# Patient Record
Sex: FEMALE | Race: WHITE | Employment: FULL TIME | URBAN - NONMETROPOLITAN AREA
[De-identification: names, ages, dates, MRNs, and addresses within clinical notes are randomized per-mention and may not be internally consistent; named-entity substitution may affect disease eponyms.]

---

## 2023-02-16 ENCOUNTER — HOSPITAL ENCOUNTER (OUTPATIENT)
Age: 63
Setting detail: SPECIMEN
Discharge: HOME | End: 2023-02-16

## 2023-02-16 ENCOUNTER — OFFICE VISIT (OUTPATIENT)
Dept: FAMILY MEDICINE CLINIC | Age: 63
End: 2023-02-16

## 2023-02-16 VITALS
DIASTOLIC BLOOD PRESSURE: 76 MMHG | BODY MASS INDEX: 29.52 KG/M2 | HEART RATE: 78 BPM | SYSTOLIC BLOOD PRESSURE: 114 MMHG | WEIGHT: 160.4 LBS | HEIGHT: 62 IN | OXYGEN SATURATION: 97 %

## 2023-02-16 DIAGNOSIS — N39.0 URINARY TRACT INFECTION WITH HEMATURIA, SITE UNSPECIFIED: ICD-10-CM

## 2023-02-16 DIAGNOSIS — R30.9 PAINFUL URINATION: ICD-10-CM

## 2023-02-16 DIAGNOSIS — R30.9 PAINFUL URINATION: Primary | ICD-10-CM

## 2023-02-16 DIAGNOSIS — R31.9 URINARY TRACT INFECTION WITH HEMATURIA, SITE UNSPECIFIED: ICD-10-CM

## 2023-02-16 LAB
BILIRUBIN, POC: NORMAL
BLOOD URINE, POC: NORMAL
CLARITY, POC: CLEAR
COLOR, POC: YELLOW
GLUCOSE URINE, POC: NORMAL
KETONES, POC: NORMAL
LEUKOCYTE EST, POC: NORMAL
NITRITE, POC: NORMAL
PH, POC: 6
PROTEIN, POC: NORMAL
SPECIFIC GRAVITY, POC: 1.01
UROBILINOGEN, POC: 0.2

## 2023-02-16 PROCEDURE — 87086 URINE CULTURE/COLONY COUNT: CPT

## 2023-02-16 PROCEDURE — 99203 OFFICE O/P NEW LOW 30 MIN: CPT

## 2023-02-16 PROCEDURE — 81002 URINALYSIS NONAUTO W/O SCOPE: CPT

## 2023-02-16 PROCEDURE — 99212 OFFICE O/P EST SF 10 MIN: CPT

## 2023-02-16 RX ORDER — CEPHALEXIN 500 MG/1
500 CAPSULE ORAL 3 TIMES DAILY
Qty: 21 CAPSULE | Refills: 0 | Status: SHIPPED | OUTPATIENT
Start: 2023-02-16 | End: 2023-02-23

## 2023-02-16 RX ORDER — PHENAZOPYRIDINE HYDROCHLORIDE 100 MG/1
100 TABLET, FILM COATED ORAL 3 TIMES DAILY PRN
Qty: 6 TABLET | Refills: 0 | Status: SHIPPED | OUTPATIENT
Start: 2023-02-16 | End: 2024-02-16

## 2023-02-16 SDOH — ECONOMIC STABILITY: FOOD INSECURITY: WITHIN THE PAST 12 MONTHS, THE FOOD YOU BOUGHT JUST DIDN'T LAST AND YOU DIDN'T HAVE MONEY TO GET MORE.: NEVER TRUE

## 2023-02-16 SDOH — ECONOMIC STABILITY: INCOME INSECURITY: HOW HARD IS IT FOR YOU TO PAY FOR THE VERY BASICS LIKE FOOD, HOUSING, MEDICAL CARE, AND HEATING?: NOT HARD AT ALL

## 2023-02-16 SDOH — ECONOMIC STABILITY: HOUSING INSECURITY
IN THE LAST 12 MONTHS, WAS THERE A TIME WHEN YOU DID NOT HAVE A STEADY PLACE TO SLEEP OR SLEPT IN A SHELTER (INCLUDING NOW)?: NO

## 2023-02-16 SDOH — ECONOMIC STABILITY: FOOD INSECURITY: WITHIN THE PAST 12 MONTHS, YOU WORRIED THAT YOUR FOOD WOULD RUN OUT BEFORE YOU GOT MONEY TO BUY MORE.: NEVER TRUE

## 2023-02-16 ASSESSMENT — PATIENT HEALTH QUESTIONNAIRE - PHQ9
SUM OF ALL RESPONSES TO PHQ9 QUESTIONS 1 & 2: 0
SUM OF ALL RESPONSES TO PHQ QUESTIONS 1-9: 0
SUM OF ALL RESPONSES TO PHQ QUESTIONS 1-9: 0
1. LITTLE INTEREST OR PLEASURE IN DOING THINGS: 0
SUM OF ALL RESPONSES TO PHQ QUESTIONS 1-9: 0
SUM OF ALL RESPONSES TO PHQ QUESTIONS 1-9: 0
2. FEELING DOWN, DEPRESSED OR HOPELESS: 0

## 2023-02-18 LAB
MICROORGANISM SPEC CULT: NORMAL
SPECIMEN DESCRIPTION: NORMAL

## 2023-02-20 ENCOUNTER — TELEPHONE (OUTPATIENT)
Dept: FAMILY MEDICINE CLINIC | Age: 63
End: 2023-02-20

## 2023-02-20 DIAGNOSIS — R30.9 PAINFUL URINATION: Primary | ICD-10-CM

## 2023-02-20 RX ORDER — PENTOSAN POLYSULFATE SODIUM 100 MG/1
100 CAPSULE, GELATIN COATED ORAL
Qty: 42 CAPSULE | Refills: 0 | Status: SHIPPED | OUTPATIENT
Start: 2023-02-20 | End: 2023-03-06

## 2023-02-20 ASSESSMENT — ENCOUNTER SYMPTOMS
CHEST TIGHTNESS: 0
SHORTNESS OF BREATH: 0
ABDOMINAL PAIN: 0

## 2023-02-20 NOTE — PROGRESS NOTES
Max Morgan (:  1960) is a 63 y.o. female,Established patient, here for evaluation of the following chief complaint(s):  Urinary Tract Infection (Pt is here to get established. She states she has had urination problem. She is currently having having burning with urination, pain when urination, frequent urination. She has not been to a doctor for a couple of years. )         ASSESSMENT/PLAN:  1. Painful urination  -     POCT Urinalysis no Micro  -     cephALEXin (KEFLEX) 500 MG capsule; Take 1 capsule by mouth 3 times daily for 7 days, Disp-21 capsule, R-0Normal  -     phenazopyridine (PYRIDIUM) 100 MG tablet; Take 1 tablet by mouth 3 times daily as needed for Pain, Disp-6 tablet, R-0Normal  -     Culture, Urine; Future  2. Urinary tract infection with hematuria, site unspecified  -     cephALEXin (KEFLEX) 500 MG capsule; Take 1 capsule by mouth 3 times daily for 7 days, Disp-21 capsule, R-0Normal  -     phenazopyridine (PYRIDIUM) 100 MG tablet; Take 1 tablet by mouth 3 times daily as needed for Pain, Disp-6 tablet, R-0Normal  -     Culture, Urine; Future   -Positive POCT urine we will start on Keflex 500 mg 3 times a day for 7 days. We will send urine for culture, also start on Pyridium 100 mg 3 times a day. Warning about Pyridium causing staining as well as staining contact lenses. Kidney verbalized understanding of this. If worsening symptoms such as increased fever, flank pain she is to go to the emergency department    Return in about 1 month (around 3/16/2023), or if symptoms worsen or fail to improve. Subjective   SUBJECTIVE/OBJECTIVE:  Rosie Lujan is here today for the complaint of burning with urination. Denies fever, denies chills. Denies hematuria, states this has been ongoing for several days now. She has not taken anything over-the-counter to help with this. Urinary Tract Infection      Review of Systems   Constitutional:  Negative for fatigue and fever.    Respiratory:  Negative for chest tightness and shortness of breath. Cardiovascular:  Negative for chest pain and leg swelling. Gastrointestinal:  Negative for abdominal pain. Genitourinary:  Positive for dysuria, frequency and urgency. Objective   Physical Exam  Vitals and nursing note reviewed. Constitutional:       Appearance: Normal appearance. HENT:      Head: Normocephalic. Right Ear: External ear normal.      Left Ear: External ear normal.   Cardiovascular:      Rate and Rhythm: Normal rate and regular rhythm. Pulses: Normal pulses. Heart sounds: Normal heart sounds. Pulmonary:      Effort: Pulmonary effort is normal.      Breath sounds: No wheezing or rhonchi. Abdominal:      General: Bowel sounds are normal.      Tenderness: There is no abdominal tenderness. There is no right CVA tenderness or left CVA tenderness. Musculoskeletal:      Cervical back: Normal range of motion. Skin:     Capillary Refill: Capillary refill takes less than 2 seconds. Neurological:      General: No focal deficit present. Mental Status: She is alert. An electronic signature was used to authenticate this note.     --CHERRY Galindo - CNP

## 2023-02-20 NOTE — TELEPHONE ENCOUNTER
Patient called she is still having pressure and pain from UTI, she was wanting to know what she can take with her antibiotic as it is still unbearable. Please advise.  Verified phone number to call back as [phone]

## 2023-03-16 ENCOUNTER — OFFICE VISIT (OUTPATIENT)
Dept: FAMILY MEDICINE CLINIC | Age: 63
End: 2023-03-16

## 2023-03-16 VITALS
HEART RATE: 80 BPM | WEIGHT: 157 LBS | DIASTOLIC BLOOD PRESSURE: 66 MMHG | BODY MASS INDEX: 28.72 KG/M2 | SYSTOLIC BLOOD PRESSURE: 118 MMHG | OXYGEN SATURATION: 96 %

## 2023-03-16 DIAGNOSIS — R10.9 ABDOMINAL PRESSURE: ICD-10-CM

## 2023-03-16 DIAGNOSIS — N93.9 ABNORMAL UTERINE BLEEDING (AUB): Primary | ICD-10-CM

## 2023-03-16 DIAGNOSIS — F17.219 CIGARETTE NICOTINE DEPENDENCE WITH NICOTINE-INDUCED DISORDER: ICD-10-CM

## 2023-03-16 DIAGNOSIS — Z13.220 LIPID SCREENING: ICD-10-CM

## 2023-03-16 DIAGNOSIS — N32.81 OVERACTIVE BLADDER: ICD-10-CM

## 2023-03-16 DIAGNOSIS — Z72.0 TOBACCO ABUSE: ICD-10-CM

## 2023-03-16 RX ORDER — OXYBUTYNIN CHLORIDE 10 MG/1
10 TABLET, EXTENDED RELEASE ORAL DAILY
Qty: 30 TABLET | Refills: 3 | Status: SHIPPED | OUTPATIENT
Start: 2023-03-16

## 2023-03-16 ASSESSMENT — ENCOUNTER SYMPTOMS
SINUS PRESSURE: 0
COLOR CHANGE: 0
BACK PAIN: 0
CONSTIPATION: 0
RHINORRHEA: 0
EYE DISCHARGE: 0
DIARRHEA: 0
ABDOMINAL PAIN: 1
WHEEZING: 0
SHORTNESS OF BREATH: 0
CHEST TIGHTNESS: 0
BLOOD IN STOOL: 0
EYE ITCHING: 0
SINUS PAIN: 0
NAUSEA: 0
COUGH: 0
ANAL BLEEDING: 0

## 2023-03-16 NOTE — PROGRESS NOTES
Rebecca Martin (:  1960) is a 62 y.o. female,Established patient, here for evaluation of the following chief complaint(s):  Vaginal Bleeding (Patient is here to follow up for her urinary issues and does see urology on 4/3. She has also had ongoing vaginal bleeding that comes and goes.  She had an episode last night that for over an hour she had heavy bleeding with severe pelvic pain. )         ASSESSMENT/PLAN:  1. Abnormal uterine bleeding (AUB)  -     Amb External Referral To Ob-Gyn  -     CBC with Auto Differential; Future  -     Comprehensive Metabolic Panel, Fasting; Future  -     US NON OB TRANSVAGINAL; Future  -     TSH With Reflex Ft4; Future  -     Prolactin; Future  -     Follicle Stimulating Hormone; Future  -     HCG Qualitative, Serum; Future  2. Overactive bladder  -     oxybutynin (DITROPAN XL) 10 MG extended release tablet; Take 1 tablet by mouth daily, Disp-30 tablet, R-3Normal  3. Lipid screening  -     Lipid, Fasting; Future  4. Tobacco abuse  -     CT chest diagnostic low dose; Future  -Abnormal uterine bleeding postmenopausal is noted.  When asked last regular menstruation prior to menopause she is unsure she stated \"several years\".  She does have episodic abnormal uterine bleeding last time last night.  She states her flow is heavy, did soak several pads more than 2 in 2 hours.  Stated this lasted for approximately 2 to 3 hours and then completely stopped.  During that time she had cramping which she related to feeling quite like when she had menstruations in the past.  This has happened prior several months ago.  She also has accompanying associated symptom of abdominal pressure, this is also been ongoing for several months.  She has not seen GYN or had a pelvic exam in numerous years.  This is strongly encouraged, however she states she is going to see GYN for this.  Risk of this is discussed and she agrees that she would still rather see GYN.  We will order ultrasound, as well as  blood work. Office will call with results. Will call to make appointment with GYN as soon as possible. Return in about 6 months (around 9/16/2023), or if symptoms worsen or fail to improve. Subjective   SUBJECTIVE/OBJECTIVE:  Vaginal Bleeding  The patient's primary symptoms include pelvic pain and vaginal bleeding. The patient's pertinent negatives include no genital itching, genital lesions, genital odor or vaginal discharge. This is a recurrent problem. The current episode started more than 1 month ago. The problem occurs intermittently. The problem has been unchanged. The pain is moderate. The problem affects both sides. Associated symptoms include abdominal pain. Pertinent negatives include no back pain, chills, constipation, diarrhea, dysuria, fever, flank pain, frequency, headaches or nausea. The vaginal discharge was bloody. There has been no bleeding. She has not been passing clots. She has not been passing tissue. Nothing aggravates the symptoms. She has tried nothing for the symptoms. No, her partner does not have an STD. She is postmenopausal. Her past medical history is significant for ovarian cysts. Review of Systems   Constitutional:  Negative for chills, fatigue, fever and unexpected weight change. HENT:  Negative for congestion, ear pain, rhinorrhea, sinus pressure and sinus pain. Eyes:  Negative for discharge, itching and visual disturbance. Respiratory:  Negative for cough, chest tightness, shortness of breath and wheezing. Cardiovascular:  Negative for chest pain, palpitations and leg swelling. Gastrointestinal:  Positive for abdominal pain. Negative for anal bleeding, blood in stool, constipation, diarrhea and nausea. Endocrine: Negative for cold intolerance, heat intolerance, polydipsia and polyphagia. Genitourinary:  Positive for pelvic pain and vaginal bleeding. Negative for dysuria, flank pain, frequency, vaginal discharge and vaginal pain.    Musculoskeletal: Negative for arthralgias, back pain and myalgias. Skin:  Negative for color change. Allergic/Immunologic: Negative for environmental allergies and food allergies. Neurological:  Negative for dizziness, weakness, light-headedness, numbness and headaches. Hematological:  Does not bruise/bleed easily. Psychiatric/Behavioral:  Negative for agitation, behavioral problems and confusion. The patient is not nervous/anxious. Objective   Physical Exam  Vitals and nursing note reviewed. Constitutional:       Appearance: Normal appearance. HENT:      Head: Normocephalic. Right Ear: External ear normal.      Left Ear: External ear normal.   Cardiovascular:      Rate and Rhythm: Normal rate and regular rhythm. Pulses: Normal pulses. Heart sounds: Normal heart sounds. Pulmonary:      Effort: Pulmonary effort is normal.      Breath sounds: No wheezing or rhonchi. Abdominal:      General: Bowel sounds are normal.      Tenderness: There is no abdominal tenderness. There is no right CVA tenderness or left CVA tenderness. Genitourinary:     Comments: Refused, stated she is going to see GYN, referral then placed with U/S  Musculoskeletal:         General: Normal range of motion. Cervical back: Normal range of motion. Skin:     Capillary Refill: Capillary refill takes less than 2 seconds. Neurological:      General: No focal deficit present. Mental Status: She is alert. An electronic signature was used to authenticate this note.     --CHERRY Rudd - CNP

## 2023-03-17 LAB
ALBUMIN/GLOBULIN RATIO: 1.4 G/DL
ALBUMIN: 4.1 G/DL (ref 3.5–5)
ALP BLD-CCNC: 63 UNITS/L (ref 38–126)
ALT SERPL-CCNC: 13 UNITS/L (ref 4–35)
ANION GAP SERPL CALCULATED.3IONS-SCNC: 2 MMOL/L
AST SERPL-CCNC: 20 UNITS/L (ref 14–36)
BASOPHILS %: 1.84 (ref 0–3)
BASOPHILS ABSOLUTE: 0.18 (ref 0–0.3)
BILIRUB SERPL-MCNC: 0.6 MG/DL (ref 0.2–1.3)
BUN BLDV-MCNC: 10 MG/DL (ref 7–17)
CALCIUM SERPL-MCNC: 9.2 MG/DL (ref 8.4–10.2)
CHLORIDE BLD-SCNC: 106 MMOL/L (ref 98–120)
CHOLESTEROL/HDL RATIO: 6.13 RATIO (ref 0–4.5)
CHOLESTEROL: 239 MG/DL (ref 50–200)
CO2: 30 MMOL/L (ref 22–31)
CREAT SERPL-MCNC: 0.5 MG/DL (ref 0.5–1)
EOSINOPHILS %: 1.31 (ref 0–10)
EOSINOPHILS ABSOLUTE: 0.13 (ref 0–1.1)
GFR CALCULATED: > 60
GLOBULIN: 3 G/DL
GLUCOSE: 102 MG/DL (ref 65–105)
HCT VFR BLD CALC: 43.6 % (ref 37–47)
HDLC SERPL-MCNC: 39 MG/DL (ref 36–68)
HEMOGLOBIN: 15.1 (ref 12–16)
LDL CHOLESTEROL CALCULATED: 157.4 MG/DL (ref 0–160)
LYMPHOCYTE %: 30.09 (ref 20–51.1)
LYMPHOCYTES ABSOLUTE: 2.92 (ref 1–5.5)
MCH RBC QN AUTO: 30.7 PG (ref 28.5–32.5)
MCHC RBC AUTO-ENTMCNC: 34.6 G/DL (ref 32–37)
MCV RBC AUTO: 88.8 FL (ref 80–94)
MONOCYTES %: 4.97 (ref 1.7–9.3)
MONOCYTES ABSOLUTE: 0.48 (ref 0.1–1)
NEUTROPHILS %: 61.78 (ref 42.2–75.2)
NEUTROPHILS ABSOLUTE: 5.99 (ref 2–8.1)
PDW BLD-RTO: 12.2 % (ref 8.5–15.5)
PLATELET # BLD: 325.5 THOU/MM3 (ref 130–400)
POTASSIUM SERPL-SCNC: 4.6 MMOL/L (ref 3.6–5)
RBC: 4.91 M/UL (ref 4.2–5.4)
SODIUM BLD-SCNC: 139 MMOL/L (ref 135–145)
TOTAL PROTEIN, SERUM: 7.1 G/DL (ref 6.3–8.2)
TRIGL SERPL-MCNC: 213 MG/DL (ref 10–250)
VLDLC SERPL CALC-MCNC: 43 MG/DL (ref 0–50)
WBC: 9.7 THOU/ML3 (ref 4.8–10.8)

## 2023-03-29 ENCOUNTER — TRANSCRIBE ORDERS (OUTPATIENT)
Dept: ADMINISTRATIVE | Age: 63
End: 2023-03-29

## 2023-03-29 DIAGNOSIS — M54.12 RADICULOPATHY, CERVICAL REGION: Primary | ICD-10-CM

## 2023-04-03 ENCOUNTER — HOSPITAL ENCOUNTER (OUTPATIENT)
Age: 63
Discharge: HOME | End: 2023-04-03
Payer: COMMERCIAL

## 2023-04-03 ENCOUNTER — OFFICE VISIT (OUTPATIENT)
Dept: UROLOGY | Age: 63
End: 2023-04-03
Payer: COMMERCIAL

## 2023-04-03 VITALS
WEIGHT: 154 LBS | RESPIRATION RATE: 12 BRPM | OXYGEN SATURATION: 97 % | HEART RATE: 78 BPM | HEIGHT: 62 IN | DIASTOLIC BLOOD PRESSURE: 86 MMHG | SYSTOLIC BLOOD PRESSURE: 136 MMHG | BODY MASS INDEX: 28.34 KG/M2

## 2023-04-03 DIAGNOSIS — R10.9 ABDOMINAL PAIN, UNSPECIFIED ABDOMINAL LOCATION: Primary | ICD-10-CM

## 2023-04-03 DIAGNOSIS — R31.0 GROSS HEMATURIA: ICD-10-CM

## 2023-04-03 DIAGNOSIS — R10.9 ABDOMINAL PAIN, UNSPECIFIED ABDOMINAL LOCATION: ICD-10-CM

## 2023-04-03 LAB
CREAT SERPL-MCNC: 0.57 MG/DL (ref 0.5–0.9)
GFR SERPL CREATININE-BSD FRML MDRD: >60 ML/MIN/1.73M2

## 2023-04-03 PROCEDURE — 82565 ASSAY OF CREATININE: CPT

## 2023-04-03 PROCEDURE — 99204 OFFICE O/P NEW MOD 45 MIN: CPT | Performed by: UROLOGY

## 2023-04-03 PROCEDURE — 36415 COLL VENOUS BLD VENIPUNCTURE: CPT

## 2023-04-03 RX ORDER — POLYETHYLENE GLYCOL 3350 17 G/17G
17 POWDER, FOR SOLUTION ORAL DAILY
Qty: 1530 G | Refills: 1 | Status: SHIPPED | OUTPATIENT
Start: 2023-04-03 | End: 2023-05-03

## 2023-04-03 NOTE — PROGRESS NOTES
extended release tablet Take 1 tablet by mouth daily 30 tablet 3    phenazopyridine (PYRIDIUM) 100 MG tablet Take 1 tablet by mouth 3 times daily as needed for Pain 6 tablet 0       Patient has no known allergies. Social History     Tobacco Use   Smoking Status Every Day    Packs/day: 1.00    Years: 45.00    Pack years: 45.00    Types: Cigarettes    Start date: 1/5/1976   Smokeless Tobacco Never      (If patient a smoker, smoking cessation counseling offered)   Social History     Substance and Sexual Activity   Alcohol Use Never       REVIEW OF SYSTEMS:  Constitutional: negative  Eyes: negative  Respiratory: negative  Cardiovascular: negative  Gastrointestinal: negative  Genitourinary: see HPI  Musculoskeletal: negative  Skin: negative   Neurological: negative  Hematological/Lymphatic: negative  Psychological: negative      Physical Exam:    This a 63 y.o. female  Vitals:    04/03/23 1000   BP: 136/86   Pulse: 78   Resp: 12   SpO2: 97%     Body mass index is 28.17 kg/m². Constitutional: Patient in no acute distress;       Assessment and Plan        1. Abdominal pain, unspecified abdominal location    2. Gross hematuria               Plan:      Abdominal pain- pt feels like she is having a hard time voiding. Pressure++ some discomfort on left lower flank. Gross hematuria- smoker. Micro on UA. A few episodes of hematuria. Will get CT urogram and cystoscopy. Prescriptions Ordered:  No orders of the defined types were placed in this encounter. Orders Placed:  No orders of the defined types were placed in this encounter.            René Zaman MD

## 2023-04-04 LAB
BACTERIA, URINE: ABNORMAL /HPF
BILIRUBIN URINE: NEGATIVE
BLOOD, URINE: ABNORMAL
CASTS UA: ABNORMAL /LPF
CLARITY: CLEAR
COLOR, URINE: YELLOW
CRYSTALS, UA: ABNORMAL
GLUCOSE URINE: NEGATIVE MG/DL
KETONES, URINE: NEGATIVE MG/DL
LEUKOCYTE ESTERASE, URINE: NEGATIVE
NITRITE, URINE: NEGATIVE
PH UA: 6 (ref 5–8.5)
PROTEIN UA: NEGATIVE MG/DL
RBC URINE: ABNORMAL /HPF (ref 0–2)
SPECIFIC GRAVITY, URINE: 1.01 MG/DL (ref 1–1.03)
SQUAMOUS EPITHELIAL: ABNORMAL /HPF
UROBILINOGEN, URINE: 0.2 MG/DL (ref 0.2–1)
WBC URINE: ABNORMAL /HPF (ref 0–4)

## 2023-04-05 LAB
FOLLICLE STIMULATING HORMONE: 76 MIU/ML (ref 25.8–134.8)
HCG QUALITATIVE: NEGATIVE
PROLACTIN: 33.49 NG/ML (ref 4.79–23.3)
TSH REFLEX FT4: 2.23 MIU/ML (ref 0.49–4.67)

## 2023-04-06 LAB
APTT: 28.8 SEC (ref 22–32)
BANDED NEUTROPHILS RELATIVE PERCENT: 0 % (ref 0–5)
BASOPHILS %. MANUAL COUNT: 0 % (ref 0–1)
EOSINOPHILS % MANUAL COUNT: 0 (ref 0–10)
HCT VFR BLD CALC: 45.7 % (ref 37–47)
HEMOGLOBIN: 15.3 (ref 12–16)
INR BLD: 1.1 RATIO (ref 0.8–1.2)
LYMPHOCYTES % MANUAL COUNT: 5 % (ref 21–51)
MCH RBC QN AUTO: 30.6 PG (ref 28.5–32.5)
MCHC RBC AUTO-ENTMCNC: 33.6 G/DL (ref 32–37)
MCV RBC AUTO: 91.1 FL (ref 80–94)
MONOCYTES RELATIVE PERCENT: 2 % (ref 2–9)
NEUTROPHILS %. MANUAL COUNT: 93 % (ref 42–75)
PDW BLD-RTO: 12.2 % (ref 8.5–15.5)
PLATELET # BLD: 264.7 THOU/MM3 (ref 130–400)
PROTHROMBIN TIME: 11.9 SEC (ref 9.3–12.5)
RBC: 5.01 M/UL (ref 4.2–5.4)
WBC: 10.3 THOU/ML3 (ref 4.8–10.8)

## 2023-04-07 LAB — CA 125: 199 U/ML

## 2023-06-05 DIAGNOSIS — N32.81 OVERACTIVE BLADDER: ICD-10-CM

## 2023-06-05 RX ORDER — OXYBUTYNIN CHLORIDE 10 MG/1
TABLET, EXTENDED RELEASE ORAL
Qty: 30 TABLET | Refills: 3 | Status: SHIPPED | OUTPATIENT
Start: 2023-06-05

## 2023-06-05 NOTE — TELEPHONE ENCOUNTER
Conrado Mayfield is requesting a refill on the following medication(s):  Requested Prescriptions     Pending Prescriptions Disp Refills    oxybutynin (DITROPAN-XL) 10 MG extended release tablet [Pharmacy Med Name: OXYBUTYNIN CL ER 10 MG TABLET] 30 tablet 3     Sig: take 1 tablet by mouth once daily       Last Visit Date (If Applicable):  0/23/0829    Next Visit Date:    9/19/2023

## 2023-08-30 DIAGNOSIS — N32.81 OVERACTIVE BLADDER: ICD-10-CM

## 2023-08-30 RX ORDER — OXYBUTYNIN CHLORIDE 10 MG/1
10 TABLET, EXTENDED RELEASE ORAL DAILY
Qty: 30 TABLET | Refills: 3 | Status: SHIPPED | OUTPATIENT
Start: 2023-08-30

## 2023-08-30 NOTE — TELEPHONE ENCOUNTER
Requested Prescriptions     Pending Prescriptions Disp Refills    oxybutynin (DITROPAN-XL) 10 MG extended release tablet 30 tablet 3     Sig: Take 1 tablet by mouth daily